# Patient Record
Sex: FEMALE | Race: WHITE | NOT HISPANIC OR LATINO | Employment: FULL TIME | URBAN - METROPOLITAN AREA
[De-identification: names, ages, dates, MRNs, and addresses within clinical notes are randomized per-mention and may not be internally consistent; named-entity substitution may affect disease eponyms.]

---

## 2017-01-11 ENCOUNTER — OFFICE VISIT (OUTPATIENT)
Dept: URGENT CARE | Facility: CLINIC | Age: 47
End: 2017-01-11
Payer: COMMERCIAL

## 2017-01-11 VITALS
OXYGEN SATURATION: 97 % | TEMPERATURE: 98.7 F | WEIGHT: 182 LBS | BODY MASS INDEX: 30.32 KG/M2 | DIASTOLIC BLOOD PRESSURE: 68 MMHG | HEIGHT: 65 IN | HEART RATE: 80 BPM | SYSTOLIC BLOOD PRESSURE: 128 MMHG | RESPIRATION RATE: 14 BRPM

## 2017-01-11 DIAGNOSIS — H10.023 PINK EYE DISEASE OF BOTH EYES: ICD-10-CM

## 2017-01-11 PROCEDURE — 99203 OFFICE O/P NEW LOW 30 MIN: CPT | Performed by: FAMILY MEDICINE

## 2017-01-11 RX ORDER — SULFACETAMIDE SODIUM 100 MG/ML
1 SOLUTION/ DROPS OPHTHALMIC
Qty: 1 BOTTLE | Refills: 0 | Status: SHIPPED | OUTPATIENT
Start: 2017-01-11 | End: 2022-07-11

## 2017-01-11 ASSESSMENT — VISUAL ACUITY
OD_CC: 20/20
OS_CC: 20/50

## 2017-01-11 NOTE — PROGRESS NOTES
"    Chief Complaint   Patient presents with   • Eye Problem     uri symptoms x 1 day . l eye irritation x 2 days .           CC:  here for \"pink eye\"      Patient comes in complaining of bilateral eye discharge for 2 d.   C/o white to yellow discharge from the eye.   reports no eye pain, just some itchiness as well as irritation.  visual acuity is unchanged.   has no concurrent fever, chills, cough or upper airway congestion. No sinus pain or pressure.   has not tried anything for this. Nothing seems to make it better or worse.          Social History   Substance Use Topics   • Smoking status: Never Smoker    • Smokeless tobacco: None   • Alcohol Use: Yes      Comment: 1 per month         Social - currently in school.   Questionable sick contacts    No current outpatient prescriptions on file prior to visit.     No current facility-administered medications on file prior to visit.           Past Medical History   Diagnosis Date   • Arrhythmia              ROS  Denies cough, chills or abdominal pain or dysuria.    Objective:    Blood pressure 128/68, pulse 80, temperature 37.1 °C (98.7 °F), resp. rate 14, height 1.651 m (5' 5\"), weight 82.555 kg (182 lb), last menstrual period 01/04/2017, SpO2 97 %, not currently breastfeeding.    EXAM      HEENT - PERRLA, EOMI.  There is bilateral conjunctival injection and discharge.  No posterior pharyngeal erythema or exudates  No oral cavity lesions  Ears - TMs both clear.     Neuro - alert and oriented x3. CN 2-12 grossly intact.  Lungs - CTA. No wheezes, rhonchi or rales.  Heart - regular rate and rhythm without murmur.  Musculoskeletal - No lower extremity edema noted.     Psych - behavior normal    assessment & plan    1. Bacterial conjunctivitis     - sulfacetamide (SULAMYD) 10 % Solution; Place 1 Drop in both eyes every 3 hours.  Dispense: 1 Bottle; Refill: 0         "

## 2017-01-11 NOTE — MR AVS SNAPSHOT
"        Chaparrita Delgado   2017 10:00 AM   Office Visit   MRN: [id number]    Department:  [facility]   Dept Phone:  [phone]    Description:  Female : 1970   Provider:  Mahamed Betancourt M.D.           Reason for Visit     Eye Problem uri symptoms x 1 day . l eye irritation x 2 days .      Allergies as of 2017     Allergen Noted Reactions    Nkda [No Known Drug Allergy] 2010         You were diagnosed with     Pink eye disease of both eyes   [3615287]         Vital Signs     Blood Pressure Pulse Temperature Respirations Height Weight    128/68 mmHg 80 37.1 °C (98.7 °F) 14 1.651 m (5' 5\") 82.555 kg (182 lb)    Body Mass Index Oxygen Saturation Last Menstrual Period Breastfeeding? Smoking Status       30.29 kg/m2 97% 2017 No Never Smoker        Basic Information     Date Of Birth Sex Race Ethnicity Preferred Language    1970 Female White Non- English      Health Maintenance        Date Due Completion Dates    IMM DTaP/Tdap/Td Vaccine (1 - Tdap) 1990 ---    PAP SMEAR 1992 ---    MAMMOGRAM 2011 ---    IMM INFLUENZA (1) 2016 ---            Current Immunizations     No immunizations on file.      Below and/or attached are the medications your provider expects you to take. Review all of your home medications and newly ordered medications with your provider and/or pharmacist. Follow medication instructions as directed by your provider and/or pharmacist. Please keep your medication list with you and share with your provider. Update the information when medications are discontinued, doses are changed, or new medications (including over-the-counter products) are added; and carry medication information at all times in the event of emergency situations     Allergies:  NKDA - (reactions not documented)               Medications  Valid as of: 2017 - 11:24 AM    Generic Name Brand Name Tablet Size Instructions for use    Sulfacetamide Sodium " (Solution) SULAMYD 10 % Place 1 Drop in both eyes every 3 hours.        .                 Medicines prescribed today were sent to:     [id number] - [place], [place] - [address]    [address] [place] [address]    Phone: [phone] Fax: [phone]    Open 24 Hours?: No      Medication refill instructions:       If your prescription bottle indicates you have medication refills left, it is not necessary to call your provider’s office. Please contact your pharmacy and they will refill your medication.    If your prescription bottle indicates you do not have any refills left, you may request refills at any time through one of the following ways: The online opinions.h system (except Urgent Care), by calling your provider’s office, or by asking your pharmacy to contact your provider’s office with a refill request. Medication refills are processed only during regular business hours and may not be available until the next business day. Your provider may request additional information or to have a follow-up visit with you prior to refilling your medication.   *Please Note: Medication refills are assigned a new Rx number when refilled electronically. Your pharmacy may indicate that no refills were authorized even though a new prescription for the same medication is available at the pharmacy. Please request the medicine by name with the pharmacy before contacting your provider for a refill.           opinions.h Access Code: [email or web address]  Expires: 2/10/2017  9:50 AM    opinions.h  A secure, online tool to manage your health information     [employer]’s opinions.h® is a secure, online tool that connects you to your personalized health information from the privacy of your home -- day or night - making it very easy for you to manage your healthcare. Once the activation process is completed, you can even access your medical information using the opinions.h niyah, which is available for free in the Apple Niyah store or Gre[employer]n  Play store.     Gridium provides the following levels of access (as shown below):   My Chart Features   [facility] Doctor [facility]  Specialists [facility]  Urgent  Care Non-[facility]  Primary Care  Doctor   Email your healthcare team securely and privately 24/7 X X X    Manage appointments: schedule your next appointment; view details of past/upcoming appointments X      Request prescription refills. X      View recent personal medical records, including lab and immunizations X X X X   View health record, including health history, allergies, medications X X X X   Read reports about your outpatient visits, procedures, consult and ER notes X X X X   See your discharge summary, which is a recap of your hospital and/or ER visit that includes your diagnosis, lab results, and care plan. X X       How to register for Gridium:  1. Go to  [email or web address].  2. Click on the Sign Up Now box, which takes you to the New Member Sign Up page. You will need to provide the following information:  a. Enter your Gridium Access Code exactly as it appears at the top of this page. (You will not need to use this code after you’ve completed the sign-up process. If you do not sign up before the expiration date, you must request a new code.)   b. Enter your date of birth.   c. Enter your home email address.   d. Click Submit, and follow the next screen’s instructions.  3. Create a Gridium ID. This will be your Gridium login ID and cannot be changed, so think of one that is secure and easy to remember.  4. Create a Gridium password. You can change your password at any time.  5. Enter your Password Reset Question and Answer. This can be used at a later time if you forget your password.   6. Enter your e-mail address. This allows you to receive e-mail notifications when new information is available in Gridium.  7. Click Sign Up. You can now view your health information.    For assistance activating your Gridium account, call [phone]

## 2017-07-25 ENCOUNTER — TELEPHONE (OUTPATIENT)
Dept: CARDIOLOGY | Facility: MEDICAL CENTER | Age: 47
End: 2017-07-25

## 2017-07-25 NOTE — TELEPHONE ENCOUNTER
Chart Prep: LVM to CB regarding NP appointment, if there is any records that need to be collected before visit.

## 2017-08-01 ENCOUNTER — OFFICE VISIT (OUTPATIENT)
Dept: CARDIOLOGY | Facility: MEDICAL CENTER | Age: 47
End: 2017-08-01
Payer: COMMERCIAL

## 2017-08-01 VITALS
OXYGEN SATURATION: 96 % | HEART RATE: 82 BPM | BODY MASS INDEX: 33.15 KG/M2 | HEIGHT: 65 IN | DIASTOLIC BLOOD PRESSURE: 80 MMHG | WEIGHT: 199 LBS | SYSTOLIC BLOOD PRESSURE: 118 MMHG

## 2017-08-01 DIAGNOSIS — R00.2 HEART PALPITATIONS: ICD-10-CM

## 2017-08-01 DIAGNOSIS — R00.2 PALPITATIONS: ICD-10-CM

## 2017-08-01 DIAGNOSIS — I47.19 AVNRT (AV NODAL RE-ENTRY TACHYCARDIA): ICD-10-CM

## 2017-08-01 LAB — EKG IMPRESSION: NORMAL

## 2017-08-01 PROCEDURE — 93000 ELECTROCARDIOGRAM COMPLETE: CPT | Performed by: INTERNAL MEDICINE

## 2017-08-01 PROCEDURE — 99204 OFFICE O/P NEW MOD 45 MIN: CPT | Performed by: INTERNAL MEDICINE

## 2017-08-01 ASSESSMENT — ENCOUNTER SYMPTOMS
FEVER: 0
SORE THROAT: 0
SPUTUM PRODUCTION: 0
ORTHOPNEA: 0
WHEEZING: 0
CARDIOVASCULAR NEGATIVE: 1
DIZZINESS: 0
LOSS OF CONSCIOUSNESS: 0
NEUROLOGICAL NEGATIVE: 1
WEAKNESS: 0
PALPITATIONS: 0
CONSTITUTIONAL NEGATIVE: 1
EYES NEGATIVE: 1
MUSCULOSKELETAL NEGATIVE: 1
CHILLS: 0
COUGH: 0
STRIDOR: 0
CLAUDICATION: 0
HEMOPTYSIS: 0
PND: 0
SHORTNESS OF BREATH: 0
BRUISES/BLEEDS EASILY: 0
RESPIRATORY NEGATIVE: 1
GASTROINTESTINAL NEGATIVE: 1

## 2017-08-01 NOTE — MR AVS SNAPSHOT
"        Chaparrita Delgado   2017 3:00 PM   Office Visit   MRN: [id number]    Department:  [facility]   Dept Phone:  [phone]    Description:  Female : 1970   Provider:  Price Nuñez M.D.           Reason for Visit     New Patient           Allergies as of 2017     Allergen Noted Reactions    Nkda [No Known Drug Allergy] 2010         You were diagnosed with     Heart palpitations   [697072]       Palpitations   [785.1.ICD-9-CM]       AVNRT (AV erick re-entry tachycardia)   [231554]         Vital Signs     Blood Pressure Pulse Height Weight Body Mass Index Oxygen Saturation    118/80 mmHg 82 1.651 m (5' 5\") 90.266 kg (199 lb) 33.12 kg/m2 96%    Smoking Status                   Never Smoker            Basic Information     Date Of Birth Sex Race Ethnicity Preferred Language    1970 Female White Non- English      Your appointments     Aug 18, 2017  9:15 AM   ECHO with [facility]AM [facility]   [facility]    [address] [address]   [phone]            2017  2:30 PM   FOLLOW UP with Price Nuñez M.D.   [facility] (--)    [address]  [place] [address] [phone]              Problem List              ICD-10-CM Priority Class Noted - Resolved    Palpitations R00.2   2017 - Present    AVNRT (AV erick re-entry tachycardia) I47.1   2017 - Present      Health Maintenance        Date Due Completion Dates    IMM DTaP/Tdap/Td Vaccine (1 - Tdap) 1990 ---    PAP SMEAR 1992 ---    MAMMOGRAM 2011 ---    IMM INFLUENZA (1) 2017 ---            Results       Current Immunizations     No immunizations on file.      Below and/or attached are the medications your provider expects you to take. Review all of your home medications and newly ordered medications with your provider and/or pharmacist. Follow medication instructions as directed by your provider " and/or pharmacist. Please keep your medication list with you and share with your provider. Update the information when medications are discontinued, doses are changed, or new medications (including over-the-counter products) are added; and carry medication information at all times in the event of emergency situations     Allergies:  NKDA - (reactions not documented)               Medications  Valid as of: August 01, 2017 -  3:28 PM    Generic Name Brand Name Tablet Size Instructions for use    Sulfacetamide Sodium (Solution) SULAMYD 10 % Place 1 Drop in both eyes every 3 hours.        .                 Medicines prescribed today were sent to:     [facility] - [place], [place] - [address]    [address] [place] [address]    Phone: [phone] Fax: [phone]    Open 24 Hours?: No      Medication refill instructions:       If your prescription bottle indicates you have medication refills left, it is not necessary to call your provider’s office. Please contact your pharmacy and they will refill your medication.    If your prescription bottle indicates you do not have any refills left, you may request refills at any time through one of the following ways: The online Selphee system (except Urgent Care), by calling your provider’s office, or by asking your pharmacy to contact your provider’s office with a refill request. Medication refills are processed only during regular business hours and may not be available until the next business day. Your provider may request additional information or to have a follow-up visit with you prior to refilling your medication.   *Please Note: Medication refills are assigned a new Rx number when refilled electronically. Your pharmacy may indicate that no refills were authorized even though a new prescription for the same medication is available at the pharmacy. Please request the medicine by name with the pharmacy before contacting your provider for a refill.          Your To Do List     Future Labs/Procedures Complete By Expires    ECHOCARDIOGRAM COMP W/O CONT  As directed 8/2/2018         ClassBadges Access Code: Activation code not generated  Current ClassBadges Status: Active

## 2017-08-01 NOTE — Clinical Note
[facility]   [address]  [place] [address]  Phone: [phone]  Fax: [phone]              Chaparrita Delgado  1970    Encounter Date: 8/1/2017    Price Nuñez M.D.          PROGRESS NOTE:  Subjective:   Chaparrita Delgado is a 46 y.o. female who presents today as a evaluation for tachycardia. Since she was in her teen she develops a tachycardia syndrome that feels a rapid heart rate with some lightheadedness which breaks without cold water on her face. She was seen in the ER for this and was told to do this. Initially it was only occurring every couple of months. It then started happening while she is pregnant and was seen by cardiology see her in about a cardiology and started on Lanoxin. This worked but then she was lost to follow-up. She notices this is happening about every 2 weeks and still breaks with the cold water. The problem she is having now as it is causing her symptomatically dizziness lightheadedness and some headaches. She cant figure out what brings it on or what relieves it other than the cold water.    Past Medical History   Diagnosis Date   • Arrhythmia      Past Surgical History   Procedure Laterality Date   • Pr anesth,gaby del after neuraxial anesth  6/16/2004   • Rebeka by laparoscopy  8/9/2010     Performed by HELENA PAINTER at SURGERY SAME DAY [facility]     History reviewed. No pertinent family history.  History   Smoking status   • Never Smoker    Smokeless tobacco   • Never Used     Allergies   Allergen Reactions   • Nkda [No Known Drug Allergy]      Outpatient Encounter Prescriptions as of 8/1/2017   Medication Sig Dispense Refill   • sulfacetamide (SULAMYD) 10 % Solution Place 1 Drop in both eyes every 3 hours. (Patient not taking: Reported on 8/1/2017) 1 Bottle 0     No facility-administered encounter medications on file as of 8/1/2017.     Review of Systems   Constitutional: Negative.  Negative for  "fever, chills and malaise/fatigue.   HENT: Negative.  Negative for sore throat.    Eyes: Negative.    Respiratory: Negative.  Negative for cough, hemoptysis, sputum production, shortness of breath, wheezing and stridor.    Cardiovascular: Negative.  Negative for chest pain, palpitations, orthopnea, claudication, leg swelling and PND.   Gastrointestinal: Negative.    Genitourinary: Negative.    Musculoskeletal: Negative.    Skin: Negative.    Neurological: Negative.  Negative for dizziness, loss of consciousness and weakness.   Endo/Heme/Allergies: Negative.  Does not bruise/bleed easily.   All other systems reviewed and are negative.       Objective:   /80 mmHg  Pulse 82  Ht 1.651 m (5' 5\")  Wt 90.266 kg (199 lb)  BMI 33.12 kg/m2  SpO2 96%    Physical Exam   Constitutional: She is oriented to person, place, and time. She appears well-developed and well-nourished. No distress.   HENT:   Head: Normocephalic.   Mouth/Throat: Oropharynx is clear and moist.   Eyes: EOM are normal. Pupils are equal, round, and reactive to light. Right eye exhibits no discharge. Left eye exhibits no discharge. No scleral icterus.   Neck: Normal range of motion. Neck supple. No JVD present. No tracheal deviation present.   Cardiovascular: Normal rate, regular rhythm, S1 normal, S2 normal, normal heart sounds, intact distal pulses and normal pulses.  Exam reveals no gallop, no S3, no S4 and no friction rub.    No murmur heard.   No systolic murmur is present    No diastolic murmur is present   Pulses:       Carotid pulses are 2+ on the right side, and 2+ on the left side.       Radial pulses are 2+ on the right side, and 2+ on the left side.        Dorsalis pedis pulses are 2+ on the right side, and 2+ on the left side.        Posterior tibial pulses are 2+ on the right side, and 2+ on the left side.   Pulmonary/Chest: Effort normal and breath sounds normal. No respiratory distress. She has no wheezes. She has no rales.   "   Abdominal: Soft. Bowel sounds are normal. She exhibits no distension and no mass. There is no tenderness. There is no rebound and no guarding.   Musculoskeletal: She exhibits no edema.   Neurological: She is alert and oriented to person, place, and time. No cranial nerve deficit.   Skin: Skin is warm and dry. She is not diaphoretic. No pallor.   Psychiatric: She has a normal mood and affect. Her behavior is normal. Judgment and thought content normal.   Nursing note and vitals reviewed.      Assessment:     1. Heart palpitations  EKG   2. Palpitations  ECHOCARDIOGRAM COMP W/O CONT   3. AVNRT (AV erick re-entry tachycardia)         Medical Decision Making:  Today's Assessment / Status / Plan:     46-year-old female with what sounds like an AVNRT which breaks with a vagal maneuver. It does not happen quite often enough to guarantee to  with an event recorder. I've asked her EP associates whether or not they think that this would be appropriate for a EP study or if they like to see an event recorder before. Either way I will get an echocardiogram to rule out a structurally abnormal heart.    Thank for you allowing me to take part in your patient's care, please call should you have any questions or would like to discuss this patient.      Ruthie Blanco M.D.  [address] [address]  VIA Facsimile: [phone]

## 2017-08-01 NOTE — PROGRESS NOTES
Subjective:   Chaparrita Delgado is a 46 y.o. female who presents today as a evaluation for tachycardia. Since she was in her teen she develops a tachycardia syndrome that feels a rapid heart rate with some lightheadedness which breaks without cold water on her face. She was seen in the ER for this and was told to do this. Initially it was only occurring every couple of months. It then started happening while she is pregnant and was seen by cardiology see her in about a cardiology and started on Lanoxin. This worked but then she was lost to follow-up. She notices this is happening about every 2 weeks and still breaks with the cold water. The problem she is having now as it is causing her symptomatically dizziness lightheadedness and some headaches. She cant figure out what brings it on or what relieves it other than the cold water.    Past Medical History   Diagnosis Date   • Arrhythmia      Past Surgical History   Procedure Laterality Date   • Pr anesth,gaby del after neuraxial anesth  6/16/2004   • Rebeka by laparoscopy  8/9/2010     Performed by HELENA PAINTER at SURGERY SAME DAY [facility] ORS     History reviewed. No pertinent family history.  History   Smoking status   • Never Smoker    Smokeless tobacco   • Never Used     Allergies   Allergen Reactions   • Nkda [No Known Drug Allergy]      Outpatient Encounter Prescriptions as of 8/1/2017   Medication Sig Dispense Refill   • sulfacetamide (SULAMYD) 10 % Solution Place 1 Drop in both eyes every 3 hours. (Patient not taking: Reported on 8/1/2017) 1 Bottle 0     No facility-administered encounter medications on file as of 8/1/2017.     Review of Systems   Constitutional: Negative.  Negative for fever, chills and malaise/fatigue.   HENT: Negative.  Negative for sore throat.    Eyes: Negative.    Respiratory: Negative.  Negative for cough, hemoptysis, sputum production, shortness of breath, wheezing and stridor.    Cardiovascular: Negative.  Negative for chest pain,  "palpitations, orthopnea, claudication, leg swelling and PND.   Gastrointestinal: Negative.    Genitourinary: Negative.    Musculoskeletal: Negative.    Skin: Negative.    Neurological: Negative.  Negative for dizziness, loss of consciousness and weakness.   Endo/Heme/Allergies: Negative.  Does not bruise/bleed easily.   All other systems reviewed and are negative.       Objective:   /80 mmHg  Pulse 82  Ht 1.651 m (5' 5\")  Wt 90.266 kg (199 lb)  BMI 33.12 kg/m2  SpO2 96%    Physical Exam   Constitutional: She is oriented to person, place, and time. She appears well-developed and well-nourished. No distress.   HENT:   Head: Normocephalic.   Mouth/Throat: Oropharynx is clear and moist.   Eyes: EOM are normal. Pupils are equal, round, and reactive to light. Right eye exhibits no discharge. Left eye exhibits no discharge. No scleral icterus.   Neck: Normal range of motion. Neck supple. No JVD present. No tracheal deviation present.   Cardiovascular: Normal rate, regular rhythm, S1 normal, S2 normal, normal heart sounds, intact distal pulses and normal pulses.  Exam reveals no gallop, no S3, no S4 and no friction rub.    No murmur heard.   No systolic murmur is present    No diastolic murmur is present   Pulses:       Carotid pulses are 2+ on the right side, and 2+ on the left side.       Radial pulses are 2+ on the right side, and 2+ on the left side.        Dorsalis pedis pulses are 2+ on the right side, and 2+ on the left side.        Posterior tibial pulses are 2+ on the right side, and 2+ on the left side.   Pulmonary/Chest: Effort normal and breath sounds normal. No respiratory distress. She has no wheezes. She has no rales.   Abdominal: Soft. Bowel sounds are normal. She exhibits no distension and no mass. There is no tenderness. There is no rebound and no guarding.   Musculoskeletal: She exhibits no edema.   Neurological: She is alert and oriented to person, place, and time. No cranial nerve deficit. "   Skin: Skin is warm and dry. She is not diaphoretic. No pallor.   Psychiatric: She has a normal mood and affect. Her behavior is normal. Judgment and thought content normal.   Nursing note and vitals reviewed.      Assessment:     1. Heart palpitations  EKG   2. Palpitations  ECHOCARDIOGRAM COMP W/O CONT   3. AVNRT (AV erick re-entry tachycardia)         Medical Decision Making:  Today's Assessment / Status / Plan:     46-year-old female with what sounds like an AVNRT which breaks with a vagal maneuver. It does not happen quite often enough to guarantee to  with an event recorder. I've asked her EP associates whether or not they think that this would be appropriate for a EP study or if they like to see an event recorder before. Either way I will get an echocardiogram to rule out a structurally abnormal heart.    Thank for you allowing me to take part in your patient's care, please call should you have any questions or would like to discuss this patient.

## 2017-08-04 ENCOUNTER — TELEPHONE (OUTPATIENT)
Dept: CARDIOLOGY | Facility: MEDICAL CENTER | Age: 47
End: 2017-08-04

## 2017-08-04 NOTE — TELEPHONE ENCOUNTER
----- Message from Josi Guadarrama M.D. sent at 8/3/2017 10:38 PM PDT -----  I can see her and talk to her about ablation. Thanks!    Annabelle can you schedule Ms. Delgado?    ----- Message -----     From: Price Nuñez M.D.     Sent: 8/1/2017   3:27 PM       To: Josi Guadarrama M.D.    Patient with tachycardia that breaks with cold water on her face since she was a teen, occurs every 2 weeks.  Not likely to pickup on monitor, sounds like AVNRT.  I am getting an echo but should I refer her to you directly or get an event recorder first?

## 2017-08-18 ENCOUNTER — HOSPITAL ENCOUNTER (OUTPATIENT)
Dept: CARDIOLOGY | Facility: MEDICAL CENTER | Age: 47
End: 2017-08-18
Attending: INTERNAL MEDICINE
Payer: COMMERCIAL

## 2017-08-18 DIAGNOSIS — R00.2 PALPITATIONS: ICD-10-CM

## 2017-08-18 LAB
LV EJECT FRACT MOD 2C 99903: 76.15
LV EJECT FRACT MOD 4C 99902: 64.45
LV EJECT FRACT MOD BP 99901: 68.13

## 2017-08-18 PROCEDURE — 93306 TTE W/DOPPLER COMPLETE: CPT

## 2017-08-18 PROCEDURE — 93306 TTE W/DOPPLER COMPLETE: CPT | Mod: 26 | Performed by: INTERNAL MEDICINE

## 2017-08-25 ENCOUNTER — TELEPHONE (OUTPATIENT)
Dept: CARDIOLOGY | Facility: MEDICAL CENTER | Age: 47
End: 2017-08-25

## 2017-08-25 NOTE — TELEPHONE ENCOUNTER
----- Message from Price Nuñez M.D. sent at 8/25/2017  8:31 AM PDT -----  Please let patient know echo is normal

## 2018-11-06 ENCOUNTER — OFFICE VISIT (OUTPATIENT)
Dept: URGENT CARE | Facility: CLINIC | Age: 48
End: 2018-11-06
Payer: COMMERCIAL

## 2018-11-06 VITALS
SYSTOLIC BLOOD PRESSURE: 98 MMHG | DIASTOLIC BLOOD PRESSURE: 82 MMHG | HEIGHT: 65 IN | WEIGHT: 199 LBS | RESPIRATION RATE: 16 BRPM | OXYGEN SATURATION: 96 % | HEART RATE: 96 BPM | BODY MASS INDEX: 33.15 KG/M2 | TEMPERATURE: 98.4 F

## 2018-11-06 DIAGNOSIS — R30.0 DYSURIA: ICD-10-CM

## 2018-11-06 LAB
APPEARANCE UR: NORMAL
BILIRUB UR STRIP-MCNC: NORMAL MG/DL
COLOR UR AUTO: YELLOW
GLUCOSE UR STRIP.AUTO-MCNC: NORMAL MG/DL
KETONES UR STRIP.AUTO-MCNC: NORMAL MG/DL
LEUKOCYTE ESTERASE UR QL STRIP.AUTO: NORMAL
NITRITE UR QL STRIP.AUTO: NORMAL
PH UR STRIP.AUTO: 5 [PH] (ref 5–8)
PROT UR QL STRIP: 30 MG/DL
RBC UR QL AUTO: NORMAL
SP GR UR STRIP.AUTO: 1.02
UROBILINOGEN UR STRIP-MCNC: NORMAL MG/DL

## 2018-11-06 PROCEDURE — 99214 OFFICE O/P EST MOD 30 MIN: CPT | Performed by: FAMILY MEDICINE

## 2018-11-06 PROCEDURE — 81002 URINALYSIS NONAUTO W/O SCOPE: CPT | Performed by: FAMILY MEDICINE

## 2018-11-06 RX ORDER — AMOXAPINE 25 MG/1
25 TABLET ORAL NIGHTLY
COMMUNITY
End: 2022-07-11

## 2018-11-06 RX ORDER — PHENAZOPYRIDINE HYDROCHLORIDE 200 MG/1
200 TABLET, FILM COATED ORAL 3 TIMES DAILY
Qty: 6 TAB | Refills: 0 | Status: SHIPPED | OUTPATIENT
Start: 2018-11-06 | End: 2018-11-08

## 2018-11-06 RX ORDER — NITROFURANTOIN 25; 75 MG/1; MG/1
100 CAPSULE ORAL EVERY 12 HOURS
Qty: 10 CAP | Refills: 0 | Status: SHIPPED | OUTPATIENT
Start: 2018-11-06 | End: 2018-11-11

## 2018-11-06 ASSESSMENT — ENCOUNTER SYMPTOMS
NAUSEA: 0
VOMITING: 0
DIZZINESS: 0
FEVER: 0
CHILLS: 0

## 2018-11-07 NOTE — PROGRESS NOTES
"Subjective:      Chaparrita Delgado is a 48 y.o. female who presents with UTI (bladder pressure, burning, pain, x today )      - This is a very pleasant, well and non-toxic appearing 48 y.o. female with complaints of freq/burn x today. No NVFC          ALLERGIES:  Nkda [no known drug allergy]     PMH:  Past Medical History:   Diagnosis Date   • Arrhythmia         MEDS:    Current Outpatient Prescriptions:   •  amoxapine (ASENDIN) 25 MG Tab, Take 25 mg by mouth every evening., Disp: , Rfl:   •  nitrofurantoin monohydr macro (MACROBID) 100 MG Cap, Take 1 Cap by mouth every 12 hours for 5 days., Disp: 10 Cap, Rfl: 0  •  phenazopyridine (PYRIDIUM) 200 MG Tab, Take 1 Tab by mouth 3 times a day for 2 days., Disp: 6 Tab, Rfl: 0  •  sulfacetamide (SULAMYD) 10 % Solution, Place 1 Drop in both eyes every 3 hours. (Patient not taking: Reported on 8/1/2017), Disp: 1 Bottle, Rfl: 0    ** I have documented what I find to be significant in regards to past medical, social, family and surgical history  in my HPI or under PMH/PSH/FH review section, otherwise it is contributory **           HPI    Review of Systems   Constitutional: Negative for chills and fever.   Gastrointestinal: Negative for nausea and vomiting.   Genitourinary: Positive for dysuria and frequency.   Neurological: Negative for dizziness.   All other systems reviewed and are negative.         Objective:     BP (!) 98/82   Pulse 96   Temp 36.9 °C (98.4 °F)   Resp 16   Ht 1.651 m (5' 5\")   Wt 90.3 kg (199 lb)   SpO2 96%   BMI 33.12 kg/m²      Physical Exam   Constitutional: She appears well-developed. No distress.   HENT:   Head: Normocephalic and atraumatic.   Cardiovascular: Regular rhythm.    No murmur heard.  Pulmonary/Chest: Effort normal. No respiratory distress.   Abdominal: Soft. There is no tenderness.   Neurological: She is alert. She exhibits normal muscle tone.   Skin: Skin is warm and dry.   Psychiatric: She has a normal mood and affect. Judgment " normal.   Nursing note and vitals reviewed.              Assessment/Plan:         1. Dysuria  POCT Urinalysis    nitrofurantoin monohydr macro (MACROBID) 100 MG Cap    phenazopyridine (PYRIDIUM) 200 MG Tab             Dx & d/c instructions discussed w/ patient and/or family members.     ER precautions (worsening signs symptoms and when to go to ER) discussed.    Follow up w/ PCP in 2-3 days to make sure symptoms improving and no further intervention/treatment and/or work-up needed was advised, ER if feeling worse or not improving in 2 days.    Possible side effects (i.e. Rash, GI upset/constipation, sedation, elevation of BP or sugars) of any medications given discussed.     Patient left in stable condition

## 2019-09-19 ENCOUNTER — OFFICE VISIT (OUTPATIENT)
Dept: URGENT CARE | Facility: CLINIC | Age: 49
End: 2019-09-19
Payer: COMMERCIAL

## 2019-09-19 ENCOUNTER — HOSPITAL ENCOUNTER (OUTPATIENT)
Facility: MEDICAL CENTER | Age: 49
End: 2019-09-19
Attending: PHYSICIAN ASSISTANT
Payer: COMMERCIAL

## 2019-09-19 VITALS
TEMPERATURE: 97.1 F | HEART RATE: 71 BPM | SYSTOLIC BLOOD PRESSURE: 118 MMHG | DIASTOLIC BLOOD PRESSURE: 76 MMHG | HEIGHT: 64 IN | OXYGEN SATURATION: 96 % | RESPIRATION RATE: 12 BRPM | WEIGHT: 196.8 LBS | BODY MASS INDEX: 33.6 KG/M2

## 2019-09-19 DIAGNOSIS — R39.89 SUSPECTED UTI: ICD-10-CM

## 2019-09-19 DIAGNOSIS — R30.0 DYSURIA: ICD-10-CM

## 2019-09-19 LAB
APPEARANCE UR: NORMAL
BILIRUB UR STRIP-MCNC: NEGATIVE MG/DL
COLOR UR AUTO: YELLOW
GLUCOSE UR STRIP.AUTO-MCNC: NEGATIVE MG/DL
INT CON NEG: NORMAL
INT CON POS: NORMAL
KETONES UR STRIP.AUTO-MCNC: NORMAL MG/DL
LEUKOCYTE ESTERASE UR QL STRIP.AUTO: NEGATIVE
NITRITE UR QL STRIP.AUTO: NEGATIVE
PH UR STRIP.AUTO: 5 [PH] (ref 5–8)
POC URINE PREGNANCY TEST: NEGATIVE
PROT UR QL STRIP: NEGATIVE MG/DL
RBC UR QL AUTO: NEGATIVE
SP GR UR STRIP.AUTO: 1.03
UROBILINOGEN UR STRIP-MCNC: 1 MG/DL

## 2019-09-19 PROCEDURE — 81025 URINE PREGNANCY TEST: CPT | Performed by: PHYSICIAN ASSISTANT

## 2019-09-19 PROCEDURE — 87086 URINE CULTURE/COLONY COUNT: CPT

## 2019-09-19 PROCEDURE — 99214 OFFICE O/P EST MOD 30 MIN: CPT | Performed by: PHYSICIAN ASSISTANT

## 2019-09-19 PROCEDURE — 81002 URINALYSIS NONAUTO W/O SCOPE: CPT | Performed by: PHYSICIAN ASSISTANT

## 2019-09-19 RX ORDER — NITROFURANTOIN 25; 75 MG/1; MG/1
100 CAPSULE ORAL 2 TIMES DAILY
Qty: 10 CAP | Refills: 0 | Status: SHIPPED | OUTPATIENT
Start: 2019-09-19 | End: 2019-09-24

## 2019-09-19 ASSESSMENT — ENCOUNTER SYMPTOMS
FEVER: 0
CHILLS: 0
EYE DISCHARGE: 0
ROS GI COMMENTS: SUPRAPUBIC PRESSURE
NECK PAIN: 0
SORE THROAT: 0
VOMITING: 0
MYALGIAS: 0
WHEEZING: 0
HEADACHES: 0
BACK PAIN: 1
EYE REDNESS: 0
NAUSEA: 0

## 2019-09-20 DIAGNOSIS — R30.0 DYSURIA: ICD-10-CM

## 2019-09-20 DIAGNOSIS — R39.89 SUSPECTED UTI: ICD-10-CM

## 2019-09-20 NOTE — PROGRESS NOTES
"Subjective:      Chaparrita Delgado is a 49 y.o. female who presents with UTI (x days.  Pt. complains of uncomfortable bladder, flank pain and a clear discharge.  Pt. has HX of chronic UTI's. )            Patient is a 49-year-old female presents to urgent care with concern regarding possible UTI.  Patient does report slight fullness \"to her bladder \"with lower abdominal pressure and with an increased clear discharge for the last day.  Patient does report this is typically how UTIs began for her.  She does report last UTI was probably in April.  She denies history of pyelonephritis.  She readily admits that she does not drink enough water.    UTI   This is a new problem. The current episode started yesterday. The problem occurs constantly. Pertinent negatives include no chills, congestion, fever, headaches, myalgias, nausea, neck pain, rash, sore throat or vomiting. Associated symptoms comments: Low backache  . Nothing aggravates the symptoms. She has tried nothing for the symptoms.       Review of Systems   Constitutional: Negative for chills, fever and malaise/fatigue.   HENT: Negative for congestion and sore throat.    Eyes: Negative for discharge and redness.   Respiratory: Negative for wheezing.    Gastrointestinal: Negative for nausea and vomiting.        Suprapubic pressure   Genitourinary: Positive for dysuria, frequency and urgency. Negative for hematuria.   Musculoskeletal: Positive for back pain. Negative for myalgias and neck pain.   Skin: Negative for itching and rash.   Neurological: Negative for headaches.   All other systems reviewed and are negative.         Objective:     /76   Pulse 71   Temp 36.2 °C (97.1 °F) (Temporal)   Resp 12   Ht 1.626 m (5' 4\")   Wt 89.3 kg (196 lb 12.8 oz)   LMP 08/29/2019   SpO2 96%   BMI 33.78 kg/m²    PMH:  has a past medical history of Arrhythmia.  MEDS:   Current Outpatient Medications:   •  nitrofurantoin monohyd macro (MACROBID) 100 MG Cap, Take 1 Cap " by mouth 2 times a day for 5 days., Disp: 10 Cap, Rfl: 0  •  amoxapine (ASENDIN) 25 MG Tab, Take 25 mg by mouth every evening., Disp: , Rfl:   •  sulfacetamide (SULAMYD) 10 % Solution, Place 1 Drop in both eyes every 3 hours. (Patient not taking: Reported on 8/1/2017), Disp: 1 Bottle, Rfl: 0  ALLERGIES:   Allergies   Allergen Reactions   • Nkda [No Known Drug Allergy]      SURGHX:   Past Surgical History:   Procedure Laterality Date   • THONY BY LAPAROSCOPY  8/9/2010    Performed by HELENA PAINTER at SURGERY SAME DAY [facility] ORS   • PB ANESTH,CISCO DEL AFTER NEURAXIAL ANESTH  6/16/2004     SOCHX:  reports that she has never smoked. She has never used smokeless tobacco. She reports that she drinks alcohol. She reports that she does not use drugs.  FH: Family history was reviewed, no pertinent findings to report    Physical Exam   Constitutional: She is oriented to person, place, and time. She appears well-developed.   HENT:   Head: Normocephalic and atraumatic.   Eyes: Pupils are equal, round, and reactive to light. EOM are normal.   Neck: Normal range of motion. Neck supple.   Cardiovascular: Normal rate and regular rhythm.   No murmur heard.  Pulmonary/Chest: Effort normal and breath sounds normal. No respiratory distress.   Abdominal: Soft. Bowel sounds are normal. She exhibits no distension.   Minimal suprapubic tenderness. Negative CVAT.    Musculoskeletal: Normal range of motion. She exhibits no edema.   Neurological: She is alert and oriented to person, place, and time.   Skin: Skin is warm and dry.   Psychiatric: She has a normal mood and affect. Her behavior is normal.   Vitals reviewed.            POC UA- clear- sent for culture.   Assessment/Plan:     1. Suspected UTI  - URINE CULTURE(NEW); Future  - nitrofurantoin monohyd macro (MACROBID) 100 MG Cap; Take 1 Cap by mouth 2 times a day for 5 days.  Dispense: 10 Cap; Refill: 0  - POCT Urinalysis  - POCT Pregnancy    2. Dysuria  - URINE CULTURE(NEW); Future  -  nitrofurantoin monohyd macro (MACROBID) 100 MG Cap; Take 1 Cap by mouth 2 times a day for 5 days.  Dispense: 10 Cap; Refill: 0  - POCT Urinalysis  - POCT Pregnancy    Patient is very adamant that this is usually the beginning of the UTI for her.  Patient opted to started on therapy prior to urine culture of which I will follow-up with this patient as results return.  If culture is negative she is to discontinue such.  Pt. Is to increase fluids, and take the complete duration of the therapy.   Pt. Understands and agrees with the plan.   F/U with PCP in 3-4 days as needed.

## 2019-09-21 ENCOUNTER — TELEPHONE (OUTPATIENT)
Dept: URGENT CARE | Facility: CLINIC | Age: 49
End: 2019-09-21

## 2019-09-21 NOTE — TELEPHONE ENCOUNTER
Pt called regarding the lab results. She has received an e-mail, and seen the results not sure what is going on with the results. She is also concerned about taking her medication if she should continue taking it or not. Please call her home or cell phone for further medial advise. Thank you.

## 2019-09-22 LAB
BACTERIA UR CULT: NORMAL
SIGNIFICANT IND 70042: NORMAL
SITE SITE: NORMAL
SOURCE SOURCE: NORMAL

## 2020-03-17 ENCOUNTER — OFFICE VISIT (OUTPATIENT)
Dept: URGENT CARE | Facility: CLINIC | Age: 50
End: 2020-03-17
Payer: COMMERCIAL

## 2020-03-17 ENCOUNTER — HOSPITAL ENCOUNTER (OUTPATIENT)
Facility: MEDICAL CENTER | Age: 50
End: 2020-03-17
Attending: NURSE PRACTITIONER
Payer: COMMERCIAL

## 2020-03-17 VITALS
RESPIRATION RATE: 20 BRPM | WEIGHT: 192 LBS | HEIGHT: 64 IN | SYSTOLIC BLOOD PRESSURE: 104 MMHG | TEMPERATURE: 98.2 F | OXYGEN SATURATION: 97 % | DIASTOLIC BLOOD PRESSURE: 68 MMHG | HEART RATE: 80 BPM | BODY MASS INDEX: 32.78 KG/M2

## 2020-03-17 DIAGNOSIS — N30.01 ACUTE CYSTITIS WITH HEMATURIA: ICD-10-CM

## 2020-03-17 DIAGNOSIS — R30.0 DYSURIA: ICD-10-CM

## 2020-03-17 LAB
APPEARANCE UR: NORMAL
BILIRUB UR STRIP-MCNC: NORMAL MG/DL
COLOR UR AUTO: YELLOW
GLUCOSE UR STRIP.AUTO-MCNC: NORMAL MG/DL
KETONES UR STRIP.AUTO-MCNC: NORMAL MG/DL
LEUKOCYTE ESTERASE UR QL STRIP.AUTO: NORMAL
NITRITE UR QL STRIP.AUTO: NORMAL
PH UR STRIP.AUTO: 5.5 [PH] (ref 5–8)
PROT UR QL STRIP: NORMAL MG/DL
RBC UR QL AUTO: NORMAL
SP GR UR STRIP.AUTO: 1.03
UROBILINOGEN UR STRIP-MCNC: 1 MG/DL

## 2020-03-17 PROCEDURE — 99214 OFFICE O/P EST MOD 30 MIN: CPT | Performed by: NURSE PRACTITIONER

## 2020-03-17 PROCEDURE — 87077 CULTURE AEROBIC IDENTIFY: CPT

## 2020-03-17 PROCEDURE — 87086 URINE CULTURE/COLONY COUNT: CPT

## 2020-03-17 PROCEDURE — 81002 URINALYSIS NONAUTO W/O SCOPE: CPT | Performed by: NURSE PRACTITIONER

## 2020-03-17 PROCEDURE — 87186 SC STD MICRODIL/AGAR DIL: CPT

## 2020-03-17 RX ORDER — PHENAZOPYRIDINE HYDROCHLORIDE 200 MG/1
200 TABLET, FILM COATED ORAL 3 TIMES DAILY
Qty: 6 TAB | Refills: 0 | Status: SHIPPED | OUTPATIENT
Start: 2020-03-17 | End: 2020-03-19

## 2020-03-17 RX ORDER — CEPHALEXIN 500 MG/1
500 CAPSULE ORAL
COMMUNITY
Start: 2019-04-25 | End: 2022-07-11

## 2020-03-17 RX ORDER — PHENAZOPYRIDINE HYDROCHLORIDE 100 MG/1
100 TABLET, FILM COATED ORAL
COMMUNITY
Start: 2019-04-25 | End: 2020-03-17

## 2020-03-17 RX ORDER — NITROFURANTOIN 25; 75 MG/1; MG/1
100 CAPSULE ORAL EVERY 12 HOURS
Qty: 10 CAP | Refills: 0 | Status: SHIPPED | OUTPATIENT
Start: 2020-03-17 | End: 2020-03-22

## 2020-03-17 ASSESSMENT — ENCOUNTER SYMPTOMS
CHILLS: 0
SHORTNESS OF BREATH: 0
FLANK PAIN: 0
FEVER: 0
MYALGIAS: 0
DOUBLE VISION: 0
PALPITATIONS: 0
HEADACHES: 0
ABDOMINAL PAIN: 0
NECK PAIN: 0
VOMITING: 0
CONSTIPATION: 0
SINUS PAIN: 0
WEAKNESS: 0
COUGH: 0
BLURRED VISION: 0
WEIGHT LOSS: 0
SORE THROAT: 0
NAUSEA: 0
DIZZINESS: 0

## 2020-03-17 NOTE — PROGRESS NOTES
Subjective:     Chaparrita Delgado is a 49 y.o. female who presents for Dysuria (painful and pressure, frequency  x today)      Dysuria    This is a new problem. The current episode started today. The problem occurs every urination. The problem has been unchanged. The quality of the pain is described as burning. The pain is at a severity of 4/10. There has been no fever. She is sexually active. There is no history of pyelonephritis. Pertinent negatives include no chills, flank pain, frequency, hematuria, nausea, urgency or vomiting. She has tried nothing for the symptoms. Her past medical history is significant for recurrent UTIs.     Review of Systems   Constitutional: Negative for chills, fever and weight loss.   HENT: Negative for congestion, ear discharge, ear pain, hearing loss, sinus pain, sore throat and tinnitus.    Eyes: Negative for blurred vision and double vision.   Respiratory: Negative for cough and shortness of breath.    Cardiovascular: Negative for chest pain and palpitations.   Gastrointestinal: Negative for abdominal pain, constipation, nausea and vomiting.   Genitourinary: Positive for dysuria. Negative for flank pain, frequency, hematuria and urgency.   Musculoskeletal: Negative for myalgias and neck pain.   Skin: Negative for rash.   Neurological: Negative for dizziness, weakness and headaches.       PMH:   Past Medical History:   Diagnosis Date   • Arrhythmia      ALLERGIES:   Allergies   Allergen Reactions   • Nkda [No Known Drug Allergy]      SURGHX:   Past Surgical History:   Procedure Laterality Date   • THONY BY LAPAROSCOPY  8/9/2010    Performed by HELENA PAINTER at SURGERY SAME DAY [facility] ORS   • PB ANESTH,CISCO DEL AFTER NEURAXIAL ANESTH  6/16/2004     SOCHX:   Social History     Socioeconomic History   • Marital status:      Spouse name: Not on file   • Number of children: Not on file   • Years of education: Not on file   • Highest education level: Not on file   Occupational  "History   • Not on file   Social Needs   • Financial resource strain: Not on file   • Food insecurity     Worry: Not on file     Inability: Not on file   • Transportation needs     Medical: Not on file     Non-medical: Not on file   Tobacco Use   • Smoking status: Never Smoker   • Smokeless tobacco: Never Used   Substance and Sexual Activity   • Alcohol use: Yes     Comment: occ   • Drug use: No   • Sexual activity: Not on file   Lifestyle   • Physical activity     Days per week: Not on file     Minutes per session: Not on file   • Stress: Not on file   Relationships   • Social connections     Talks on phone: Not on file     Gets together: Not on file     Attends Yazidi service: Not on file     Active member of club or organization: Not on file     Attends meetings of clubs or organizations: Not on file     Relationship status: Not on file   • Intimate partner violence     Fear of current or ex partner: Not on file     Emotionally abused: Not on file     Physically abused: Not on file     Forced sexual activity: Not on file   Other Topics Concern   • Not on file   Social History Narrative   • Not on file     FH: History reviewed. No pertinent family history.      Objective:   /68 (BP Location: Right arm, Patient Position: Sitting, BP Cuff Size: Adult long)   Pulse 80   Temp 36.8 °C (98.2 °F) (Temporal)   Resp 20   Ht 1.626 m (5' 4\")   Wt 87.1 kg (192 lb)   LMP 03/02/2020   SpO2 97%   BMI 32.96 kg/m²     Physical Exam  Vitals signs and nursing note reviewed.   Constitutional:       General: She is not in acute distress.     Appearance: Normal appearance. She is not ill-appearing.   HENT:      Head: Normocephalic and atraumatic.      Right Ear: External ear normal.      Left Ear: External ear normal.      Nose: Nose normal.      Mouth/Throat:      Mouth: Mucous membranes are moist.   Eyes:      Extraocular Movements: Extraocular movements intact.      Pupils: Pupils are equal, round, and reactive to " light.   Neck:      Musculoskeletal: Normal range of motion and neck supple. No neck rigidity or muscular tenderness.   Cardiovascular:      Rate and Rhythm: Normal rate and regular rhythm.      Heart sounds: Normal heart sounds. No murmur.   Pulmonary:      Effort: Pulmonary effort is normal.      Breath sounds: Normal breath sounds.   Abdominal:      General: Abdomen is flat.      Palpations: Abdomen is soft.      Tenderness: There is abdominal tenderness in the right lower quadrant and left lower quadrant. There is no right CVA tenderness, left CVA tenderness, guarding or rebound. Negative signs include Linares's sign, Rovsing's sign, McBurney's sign, psoas sign and obturator sign.   Musculoskeletal: Normal range of motion.   Skin:     General: Skin is warm and dry.      Capillary Refill: Capillary refill takes less than 2 seconds.   Neurological:      General: No focal deficit present.      Mental Status: She is alert and oriented to person, place, and time. Mental status is at baseline.   Psychiatric:         Mood and Affect: Mood normal.         Behavior: Behavior normal.         Thought Content: Thought content normal.         Judgment: Judgment normal.       POCT urine: Trace blood, small leuks  Assessment/Plan:   Assessment    1. Acute cystitis with hematuria  nitrofurantoin (MACROBID) 100 MG Cap    phenazopyridine (PYRIDIUM) 200 MG Tab    URINE CULTURE(NEW)   2. Dysuria  nitrofurantoin (MACROBID) 100 MG Cap    phenazopyridine (PYRIDIUM) 200 MG Tab    URINE CULTURE(NEW)     Prescription was sent in to preferred pharmacy. AVS was given and reviewed with patient. Patient educated on red flags of UTI and encouraged to seek care back in UC or ER for  fever, chills, flank pain, or worsening symptoms.   AVS handout given and reviewed with patient. Pt educated on red flags and when to seek treatment back in ER or UC.

## 2020-03-20 LAB
BACTERIA UR CULT: ABNORMAL
BACTERIA UR CULT: ABNORMAL
SIGNIFICANT IND 70042: ABNORMAL
SITE SITE: ABNORMAL
SOURCE SOURCE: ABNORMAL

## 2020-04-13 ENCOUNTER — HOSPITAL ENCOUNTER (OUTPATIENT)
Facility: MEDICAL CENTER | Age: 50
End: 2020-04-13
Attending: PHYSICIAN ASSISTANT
Payer: COMMERCIAL

## 2020-04-13 ENCOUNTER — OFFICE VISIT (OUTPATIENT)
Dept: URGENT CARE | Facility: CLINIC | Age: 50
End: 2020-04-13
Payer: COMMERCIAL

## 2020-04-13 VITALS
DIASTOLIC BLOOD PRESSURE: 62 MMHG | SYSTOLIC BLOOD PRESSURE: 110 MMHG | TEMPERATURE: 98.6 F | RESPIRATION RATE: 20 BRPM | WEIGHT: 190 LBS | HEIGHT: 64 IN | OXYGEN SATURATION: 96 % | BODY MASS INDEX: 32.44 KG/M2 | HEART RATE: 84 BPM

## 2020-04-13 DIAGNOSIS — R30.0 DYSURIA: ICD-10-CM

## 2020-04-13 DIAGNOSIS — R31.9 URINARY TRACT INFECTION WITH HEMATURIA, SITE UNSPECIFIED: ICD-10-CM

## 2020-04-13 DIAGNOSIS — N39.0 URINARY TRACT INFECTION WITH HEMATURIA, SITE UNSPECIFIED: ICD-10-CM

## 2020-04-13 LAB
APPEARANCE UR: NORMAL
BILIRUB UR STRIP-MCNC: NORMAL MG/DL
COLOR UR AUTO: YELLOW
GLUCOSE UR STRIP.AUTO-MCNC: NORMAL MG/DL
KETONES UR STRIP.AUTO-MCNC: NORMAL MG/DL
LEUKOCYTE ESTERASE UR QL STRIP.AUTO: NORMAL
NITRITE UR QL STRIP.AUTO: NORMAL
PH UR STRIP.AUTO: 5 [PH] (ref 5–8)
PROT UR QL STRIP: NORMAL MG/DL
RBC UR QL AUTO: NORMAL
SP GR UR STRIP.AUTO: 1.03
UROBILINOGEN UR STRIP-MCNC: 0.2 MG/DL

## 2020-04-13 PROCEDURE — 87086 URINE CULTURE/COLONY COUNT: CPT

## 2020-04-13 PROCEDURE — 99000 SPECIMEN HANDLING OFFICE-LAB: CPT | Performed by: PHYSICIAN ASSISTANT

## 2020-04-13 PROCEDURE — 99214 OFFICE O/P EST MOD 30 MIN: CPT | Performed by: PHYSICIAN ASSISTANT

## 2020-04-13 PROCEDURE — 81002 URINALYSIS NONAUTO W/O SCOPE: CPT | Performed by: PHYSICIAN ASSISTANT

## 2020-04-13 RX ORDER — PHENAZOPYRIDINE HYDROCHLORIDE 100 MG/1
100 TABLET, FILM COATED ORAL 3 TIMES DAILY PRN
Qty: 6 TAB | Refills: 0 | Status: SHIPPED | OUTPATIENT
Start: 2020-04-13 | End: 2022-07-11

## 2020-04-13 RX ORDER — SULFAMETHOXAZOLE AND TRIMETHOPRIM 800; 160 MG/1; MG/1
1 TABLET ORAL EVERY 12 HOURS
Qty: 6 TAB | Refills: 0 | Status: SHIPPED | OUTPATIENT
Start: 2020-04-13 | End: 2020-04-16

## 2020-04-13 ASSESSMENT — ENCOUNTER SYMPTOMS
COUGH: 0
EYE REDNESS: 0
SHORTNESS OF BREATH: 0
FLANK PAIN: 0
NAUSEA: 0
EYE DISCHARGE: 0
HEADACHES: 0
ABDOMINAL PAIN: 0
VOMITING: 0
FEVER: 0
SORE THROAT: 0

## 2020-04-13 NOTE — PROGRESS NOTES
Subjective:      Chaparrita Delgado is a 49 y.o. female who presents with Painful Urination (burning x today)        Dysuria    This is a new problem. The current episode started today. The problem occurs every urination. The problem has been unchanged. The quality of the pain is described as burning. The pain is mild. There has been no fever. Associated symptoms include frequency and urgency. Pertinent negatives include no flank pain, hematuria, nausea or vomiting. She has tried nothing for the symptoms.     PMH:  has a past medical history of Arrhythmia.  MEDS:   Current Outpatient Medications:   •  cephALEXin (KEFLEX) 500 MG Cap, Take 500 mg by mouth., Disp: , Rfl:   •  amoxapine (ASENDIN) 25 MG Tab, Take 25 mg by mouth every evening., Disp: , Rfl:   •  sulfacetamide (SULAMYD) 10 % Solution, Place 1 Drop in both eyes every 3 hours. (Patient not taking: Reported on 8/1/2017), Disp: 1 Bottle, Rfl: 0  ALLERGIES:   Allergies   Allergen Reactions   • Nkda [No Known Drug Allergy]      SURGHX:   Past Surgical History:   Procedure Laterality Date   • THONY BY LAPAROSCOPY  8/9/2010    Performed by HELENA PAINTER at SURGERY SAME DAY [facility] ORS   • PB ANESTH,CISCO DEL AFTER NEURAXIAL ANESTH  6/16/2004     SOCHX:  reports that she has never smoked. She has never used smokeless tobacco. She reports previous alcohol use. She reports that she does not use drugs.  FH: Family history was reviewed, no pertinent findings to report      Review of Systems   Constitutional: Negative for fever.   HENT: Negative for congestion, ear pain and sore throat.    Eyes: Negative for discharge and redness.   Respiratory: Negative for cough and shortness of breath.    Cardiovascular: Negative for chest pain and leg swelling.   Gastrointestinal: Negative for abdominal pain, nausea and vomiting.   Genitourinary: Positive for dysuria, frequency and urgency. Negative for flank pain and hematuria.   Musculoskeletal: Negative for joint pain.   Skin:  "Negative for rash.   Neurological: Negative for headaches.          Objective:     /62 (BP Location: Right arm, Patient Position: Sitting, BP Cuff Size: Adult)   Pulse 84   Temp 37 °C (98.6 °F) (Temporal)   Resp 20   Ht 1.626 m (5' 4\")   Wt 86.2 kg (190 lb)   LMP 04/04/2020   SpO2 96%   BMI 32.61 kg/m²      Physical Exam  Constitutional:       General: She is not in acute distress.     Appearance: Normal appearance. She is well-developed. She is not ill-appearing.   HENT:      Head: Normocephalic and atraumatic.      Right Ear: External ear normal.      Left Ear: External ear normal.      Nose: Nose normal.      Mouth/Throat:      Mouth: Mucous membranes are moist.      Pharynx: Oropharynx is clear. No posterior oropharyngeal erythema.   Eyes:      Extraocular Movements: Extraocular movements intact.      Conjunctiva/sclera: Conjunctivae normal.   Neck:      Musculoskeletal: Normal range of motion and neck supple.   Cardiovascular:      Rate and Rhythm: Normal rate and regular rhythm.      Heart sounds: Normal heart sounds.   Pulmonary:      Effort: Pulmonary effort is normal.      Breath sounds: Normal breath sounds.   Abdominal:      Palpations: Abdomen is soft.      Tenderness: There is no abdominal tenderness. There is no right CVA tenderness or left CVA tenderness.   Musculoskeletal: Normal range of motion.   Skin:     General: Skin is warm and dry.   Neurological:      Mental Status: She is alert and oriented to person, place, and time.            Progress:  Results for orders placed or performed in visit on 04/13/20   POCT Urinalysis   Result Value Ref Range    POC Color YELLOW Negative    POC Appearance CLOUDY Negative    POC Leukocyte Esterase SMALL Negative    POC Nitrites NEG Negative    POC Urobiligen 0.2 Negative (0.2) mg/dL    POC Protein NEG Negative mg/dL    POC Urine PH 5.0 5.0 - 8.0    POC Blood MOD Negative    POC Specific Gravity 1.030 <1.005 - >1.030    POC Ketones NEG Negative " mg/dL    POC Bilirubin NEG Negative mg/dL    POC Glucose NEG Negative mg/dL     Urine Culture - pending     Reviewed the patient's most recent Urine Culture on 3/17.      Assessment/Plan:     1. Dysuria  - POCT Urinalysis  - Urine Culture; Future  - phenazopyridine (PYRIDIUM) 100 MG Tab; Take 1 Tab by mouth 3 times a day as needed.  Dispense: 6 Tab; Refill: 0    2. Urinary tract infection with hematuria, site unspecified  - sulfamethoxazole-trimethoprim (BACTRIM DS) 800-160 MG tablet; Take 1 Tab by mouth every 12 hours for 3 days.  Dispense: 6 Tab; Refill: 0    Differential diagnoses, supportive care, and indications for immediate follow-up discussed with patient.   Instructed to return to clinic or nearest emergency department for any change in condition, further concerns, or worsening of symptoms.    OTC Tylenol or Motrin for fever/discomfort.  Drink plenty of fluids  Follow-up with PCP  Return to clinic or go to the ED if symptoms worsen or fail to improve, or if the patient should develop worsening/increasing urinary symptoms, hematuria, flank pain, abdominal pain, nausea/vomiting, fever/chills, and/or any concerning symptoms.    Discussed plan with the patient, and she agrees to the above.     Please note that this dictation was created using voice recognition software. I have made every reasonable attempt to correct obvious errors, but I expect that there may be errors of grammar and possibly content that I did not discover before finalizing the note.

## 2020-04-15 LAB
BACTERIA UR CULT: NORMAL
SIGNIFICANT IND 70042: NORMAL
SITE SITE: NORMAL
SOURCE SOURCE: NORMAL

## 2022-07-11 ENCOUNTER — HOSPITAL ENCOUNTER (OUTPATIENT)
Facility: MEDICAL CENTER | Age: 52
End: 2022-07-11
Attending: FAMILY MEDICINE
Payer: COMMERCIAL

## 2022-07-11 ENCOUNTER — OFFICE VISIT (OUTPATIENT)
Dept: URGENT CARE | Facility: CLINIC | Age: 52
End: 2022-07-11
Payer: COMMERCIAL

## 2022-07-11 VITALS
HEIGHT: 65 IN | BODY MASS INDEX: 31.99 KG/M2 | TEMPERATURE: 97.6 F | OXYGEN SATURATION: 95 % | SYSTOLIC BLOOD PRESSURE: 108 MMHG | HEART RATE: 82 BPM | DIASTOLIC BLOOD PRESSURE: 62 MMHG | WEIGHT: 192 LBS | RESPIRATION RATE: 18 BRPM

## 2022-07-11 DIAGNOSIS — N30.01 ACUTE CYSTITIS WITH HEMATURIA: ICD-10-CM

## 2022-07-11 DIAGNOSIS — R39.9 UTI SYMPTOMS: ICD-10-CM

## 2022-07-11 LAB
APPEARANCE UR: CLEAR
BILIRUB UR STRIP-MCNC: NEGATIVE MG/DL
COLOR UR AUTO: YELLOW
GLUCOSE UR STRIP.AUTO-MCNC: NEGATIVE MG/DL
KETONES UR STRIP.AUTO-MCNC: NEGATIVE MG/DL
LEUKOCYTE ESTERASE UR QL STRIP.AUTO: NORMAL
NITRITE UR QL STRIP.AUTO: NEGATIVE
PH UR STRIP.AUTO: 7 [PH] (ref 5–8)
PROT UR QL STRIP: NEGATIVE MG/DL
RBC UR QL AUTO: NORMAL
SP GR UR STRIP.AUTO: 1.01
UROBILINOGEN UR STRIP-MCNC: 0.2 MG/DL

## 2022-07-11 PROCEDURE — 87086 URINE CULTURE/COLONY COUNT: CPT

## 2022-07-11 PROCEDURE — 99213 OFFICE O/P EST LOW 20 MIN: CPT | Performed by: FAMILY MEDICINE

## 2022-07-11 PROCEDURE — 81002 URINALYSIS NONAUTO W/O SCOPE: CPT | Performed by: FAMILY MEDICINE

## 2022-07-11 RX ORDER — NITROFURANTOIN 25; 75 MG/1; MG/1
100 CAPSULE ORAL EVERY 12 HOURS
Qty: 10 CAPSULE | Refills: 0 | Status: SHIPPED | OUTPATIENT
Start: 2022-07-11 | End: 2022-07-16

## 2022-07-11 NOTE — PROGRESS NOTES
"  Subjective:      51 y.o. female presents to urgent care for concerns of a bladder infection.  For the last couple of weeks she has been experiencing intermittent dysuria and increased urinary frequency.  No associated hematuria or urinary urgency.  Increased urinary urgency, frequency, and dysuria.  No associated hematuria.  Bowel movementsare regular and soft.  She drinks an average of 2 glasses of water 0-1 caffeinated beverages.  She is currently sexually active with one, male partner and she has had tubal ligation for contraception.    She denies any other questions or concerns at this time.    Current problem list, medication, and past medical/surgical history were reviewed in Epic.    ROS  See HPI     Objective:      /62 (BP Location: Left arm, Patient Position: Sitting, BP Cuff Size: Adult)   Pulse 82   Temp 36.4 °C (97.6 °F) (Temporal)   Resp 18   Ht 1.651 m (5' 5\")   Wt 87.1 kg (192 lb)   LMP 07/06/2022   SpO2 95%   Breastfeeding No   BMI 31.95 kg/m²     Physical Exam  Constitutional:       General: She is not in acute distress.     Appearance: She is not diaphoretic.   Cardiovascular:      Rate and Rhythm: Normal rate and regular rhythm.      Heart sounds: Normal heart sounds.   Pulmonary:      Effort: Pulmonary effort is normal. No respiratory distress.      Breath sounds: Normal breath sounds.   Abdominal:      General: Bowel sounds are normal.      Palpations: Abdomen is soft.      Tenderness: There is no abdominal tenderness. There is no right CVA tenderness or left CVA tenderness.   Neurological:      Mental Status: She is alert.   Psychiatric:         Mood and Affect: Affect normal.         Judgment: Judgment normal.       Assessment/Plan:     1. Acute cystitis with hematuria  2. UTI symptoms  Urinalysis questionable for UTI.  I have reviewed 3 of her most recent urine cultures and they did not grow significant bacteria.  Despite this, we will treat with Macrobid.  Urine culture has " been sent.  Patient was encouraged to increase her water intake to 2 to 3 L daily.  - POCT Urinalysis  - Urine Culture; Future  - nitrofurantoin (MACROBID) 100 MG Cap; Take 1 Capsule by mouth every 12 hours for 5 days.  Dispense: 10 Capsule; Refill: 0      Instructed to return to Urgent Care or nearest Emergency Department if symptoms fail to improve, for any change in condition, further concerns, or new concerning symptoms. Patient states understanding of the plan of care and discharge instructions.    Cesia Rick M.D.

## 2022-07-12 DIAGNOSIS — R39.9 UTI SYMPTOMS: ICD-10-CM

## 2022-07-12 DIAGNOSIS — N30.01 ACUTE CYSTITIS WITH HEMATURIA: ICD-10-CM

## 2022-07-14 LAB
BACTERIA UR CULT: NORMAL
SIGNIFICANT IND 70042: NORMAL
SITE SITE: NORMAL
SOURCE SOURCE: NORMAL

## 2022-12-14 ENCOUNTER — HOSPITAL ENCOUNTER (OUTPATIENT)
Facility: MEDICAL CENTER | Age: 52
End: 2022-12-14
Attending: NURSE PRACTITIONER
Payer: COMMERCIAL

## 2022-12-14 ENCOUNTER — OFFICE VISIT (OUTPATIENT)
Dept: URGENT CARE | Facility: CLINIC | Age: 52
End: 2022-12-14
Payer: COMMERCIAL

## 2022-12-14 VITALS
WEIGHT: 198 LBS | HEART RATE: 80 BPM | SYSTOLIC BLOOD PRESSURE: 130 MMHG | RESPIRATION RATE: 20 BRPM | TEMPERATURE: 98 F | DIASTOLIC BLOOD PRESSURE: 70 MMHG | BODY MASS INDEX: 32.99 KG/M2 | HEIGHT: 65 IN | OXYGEN SATURATION: 100 %

## 2022-12-14 DIAGNOSIS — R30.0 DYSURIA: ICD-10-CM

## 2022-12-14 DIAGNOSIS — R82.998 URINE LEUKOCYTES: ICD-10-CM

## 2022-12-14 LAB
APPEARANCE UR: CLEAR
BILIRUB UR STRIP-MCNC: NORMAL MG/DL
COLOR UR AUTO: NORMAL
GLUCOSE UR STRIP.AUTO-MCNC: NORMAL MG/DL
KETONES UR STRIP.AUTO-MCNC: NORMAL MG/DL
LEUKOCYTE ESTERASE UR QL STRIP.AUTO: NORMAL
NITRITE UR QL STRIP.AUTO: NORMAL
PH UR STRIP.AUTO: 5.5 [PH] (ref 5–8)
PROT UR QL STRIP: NORMAL MG/DL
RBC UR QL AUTO: NORMAL
SP GR UR STRIP.AUTO: 1
UROBILINOGEN UR STRIP-MCNC: 0.2 MG/DL

## 2022-12-14 PROCEDURE — 81002 URINALYSIS NONAUTO W/O SCOPE: CPT | Performed by: NURSE PRACTITIONER

## 2022-12-14 PROCEDURE — 87086 URINE CULTURE/COLONY COUNT: CPT

## 2022-12-14 PROCEDURE — 99214 OFFICE O/P EST MOD 30 MIN: CPT | Performed by: NURSE PRACTITIONER

## 2022-12-14 PROCEDURE — 87077 CULTURE AEROBIC IDENTIFY: CPT

## 2022-12-14 RX ORDER — AMOXICILLIN 500 MG/1
CAPSULE ORAL
COMMUNITY
Start: 2022-11-10

## 2022-12-14 RX ORDER — PHENAZOPYRIDINE HYDROCHLORIDE 100 MG/1
100 TABLET, FILM COATED ORAL 3 TIMES DAILY PRN
Qty: 6 TABLET | Refills: 0 | Status: SHIPPED | OUTPATIENT
Start: 2022-12-14

## 2022-12-14 NOTE — PROGRESS NOTES
"Chaparrita Delgado is a 52 y.o. female who presents for Painful Urination (Had a cold last week, painful urination/burning this morning)      HPI  This is a new problem. Chaparrita Delgado is a 52 y.o. patient who presents to urgent care with c/o: Started to have dysuria last night. Came on suddenly. She was sick last night and is feeling better now. Ending her menstrual cycle .  Took 2 amoxicillin tablets yesterday ( from a tooth infection that she did not take all of her medications). Denies vaginal discharge. Not currently sexually active > 3 months. .   Denies vag discharge, flank pain, back pain, NVD    No other aggravating or alleviating factors.       ROS See HPI    Allergies:       Allergies   Allergen Reactions    Nkda [No Known Drug Allergy]        PMSFS Hx:  Past Medical History:   Diagnosis Date    Arrhythmia      Past Surgical History:   Procedure Laterality Date    THONY BY LAPAROSCOPY  8/9/2010    Performed by HELENA PAINTER at [facility]    [place] ANESTH,CISCO DEL AFTER NEURAXIAL ANESTH  6/16/2004     History reviewed. No pertinent family history.  Social History     Tobacco Use    Smoking status: Never    Smokeless tobacco: Never   Substance Use Topics    Alcohol use: Not Currently       Problems:   Patient Active Problem List   Diagnosis    Palpitations    AVNRT (AV erick re-entry tachycardia) ([facility])       Medications:   Current Outpatient Medications on File Prior to Visit   Medication Sig Dispense Refill    amoxicillin (AMOXIL) 500 MG Cap TAKE 1 CAPSULE BY MOUTH EVERY 8 HOURS UNTIL GONE       No current facility-administered medications on file prior to visit.          Objective:     /70 (BP Location: Right arm, Patient Position: Sitting, BP Cuff Size: Adult)   Pulse 80   Temp 36.7 °C (98 °F) (Temporal)   Resp 20   Ht 1.651 m (5' 5\")   Wt 89.8 kg (198 lb)   LMP 12/09/2022   SpO2 100%   BMI 32.95 kg/m²     Physical Exam  Vitals and nursing note reviewed. "   Constitutional:       General: She is not in acute distress.     Appearance: Normal appearance. She is well-developed and well-groomed. She is not ill-appearing.   HENT:      Right Ear: Hearing normal.      Left Ear: Hearing normal.      Mouth/Throat:      Lips: Pink.   Cardiovascular:      Rate and Rhythm: Normal rate.      Pulses: Normal pulses.   Pulmonary:      Effort: Pulmonary effort is normal.   Abdominal:      Palpations: Abdomen is not rigid.      Tenderness: There is no right CVA tenderness or left CVA tenderness.   Musculoskeletal:      Lumbar back: Normal.   Skin:     General: Skin is warm and dry.      Capillary Refill: Capillary refill takes less than 2 seconds.   Neurological:      Mental Status: She is alert and oriented to person, place, and time.   Psychiatric:         Mood and Affect: Mood normal.         Behavior: Behavior normal. Behavior is cooperative.         Thought Content: Thought content normal.       Results for orders placed or performed in visit on 12/14/22   POCT Urinalysis   Result Value Ref Range    POC Color LIGHT YELLOW Negative    POC Appearance CLEAR Negative    POC Leukocyte Esterase MOD Negative    POC Nitrites NEG Negative    POC Urobiligen 0.2 Negative (0.2) mg/dL    POC Protein NEG Negative mg/dL    POC Urine PH 5.5 5.0 - 8.0    POC Blood TRACE-INTACT Negative    POC Specific Gravity 1.005 <1.005 - >1.030    POC Ketones NEG Negative mg/dL    POC Bilirubin NEG Negative mg/dL    POC Glucose NEG Negative mg/dL       Assessment /Associated Orders:      1. Dysuria  POCT Urinalysis    URINE CULTURE(NEW)    phenazopyridine (PYRIDIUM) 100 MG Tab      2. Urine leukocytes              Medical Decision Making:    Pt is clinically stable at today's acute urgent care visit.  No acute distress noted. Appropriate for outpatient care at this time.   Acute problem today .   Urine culture:  Pending   + leuk in urine   Will treat based on urine culture. Pt has taken 2 amoxicillin pills  prior to collection today.   Keep well hydrated  OTC  analgesic of choice (acetaminophen or NSAID) prn pain. Follow manufactures dosing and safety precautions.     Discussed Dx, management options (risks,benefits, and alternatives to planned treatment), natural progression and supportive care.  Expressed understanding and the treatment plan was agreed upon.   Questions were encouraged and answered   Return to urgent care prn if new or worsening sx or if there is no improvement in condition prn.          Time I spent evaluating Chaparrita Delgado in urgent care today was 31  minutes. This time includes preparing for visit, reviewing any pertinent notes or test results, counseling/education, exam, obtaining HPI, interpretation of lab tests, medication management and documentation as indicated above.Time does not include separately billable procedures noted .       Please note that this dictation was created using voice recognition software. I have worked with consultants from the vendor as well as technical experts from [facility] to optimize the interface. I have made every reasonable attempt to correct obvious errors, but I expect that there are errors of grammar and possibly content that I did not discover before finalizing the note.  This note was electronically signed by provider

## 2022-12-16 LAB
BACTERIA UR CULT: NORMAL
SIGNIFICANT IND 70042: NORMAL
SITE SITE: NORMAL
SOURCE SOURCE: NORMAL

## 2023-12-19 ENCOUNTER — HOSPITAL ENCOUNTER (OUTPATIENT)
Dept: RADIOLOGY | Facility: MEDICAL CENTER | Age: 53
End: 2023-12-19
Attending: FAMILY MEDICINE
Payer: COMMERCIAL

## 2023-12-19 DIAGNOSIS — Z12.31 VISIT FOR SCREENING MAMMOGRAM: ICD-10-CM

## 2023-12-19 PROCEDURE — 77063 BREAST TOMOSYNTHESIS BI: CPT
